# Patient Record
Sex: MALE | Race: WHITE | NOT HISPANIC OR LATINO | Employment: FULL TIME | URBAN - NONMETROPOLITAN AREA
[De-identification: names, ages, dates, MRNs, and addresses within clinical notes are randomized per-mention and may not be internally consistent; named-entity substitution may affect disease eponyms.]

---

## 2017-05-08 ENCOUNTER — OFFICE VISIT (OUTPATIENT)
Dept: CARDIOLOGY | Facility: CLINIC | Age: 47
End: 2017-05-08

## 2017-05-08 VITALS
HEIGHT: 69 IN | WEIGHT: 258 LBS | HEART RATE: 67 BPM | SYSTOLIC BLOOD PRESSURE: 146 MMHG | DIASTOLIC BLOOD PRESSURE: 86 MMHG | BODY MASS INDEX: 38.21 KG/M2 | OXYGEN SATURATION: 98 %

## 2017-05-08 DIAGNOSIS — E78.2 MIXED HYPERLIPIDEMIA: Primary | ICD-10-CM

## 2017-05-08 DIAGNOSIS — I10 ESSENTIAL HYPERTENSION: ICD-10-CM

## 2017-05-08 DIAGNOSIS — E66.01 MORBID OBESITY DUE TO EXCESS CALORIES (HCC): ICD-10-CM

## 2017-05-08 PROCEDURE — 99214 OFFICE O/P EST MOD 30 MIN: CPT | Performed by: INTERNAL MEDICINE

## 2018-05-10 ENCOUNTER — OFFICE VISIT (OUTPATIENT)
Dept: CARDIOLOGY | Facility: CLINIC | Age: 48
End: 2018-05-10

## 2018-05-10 VITALS
WEIGHT: 270 LBS | BODY MASS INDEX: 39.99 KG/M2 | HEIGHT: 69 IN | HEART RATE: 72 BPM | DIASTOLIC BLOOD PRESSURE: 82 MMHG | SYSTOLIC BLOOD PRESSURE: 122 MMHG

## 2018-05-10 DIAGNOSIS — I10 ESSENTIAL HYPERTENSION: ICD-10-CM

## 2018-05-10 DIAGNOSIS — E78.00 PURE HYPERCHOLESTEROLEMIA: Primary | ICD-10-CM

## 2018-05-10 PROCEDURE — 99213 OFFICE O/P EST LOW 20 MIN: CPT | Performed by: INTERNAL MEDICINE

## 2018-05-10 NOTE — PROGRESS NOTES
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 Exertional dyspnea  1. April 2016 stress test: EF calculated at 49%, probable small fixed deect ine lateral aspect of cardiac apex , very minimal area of possible perfusion abnormality in the anterior septal wall near the apex not well defined. Mild dilation of the left ventricle.  2. Echo 9/16:  LVEF 55%, mild MR, TR, AR, RVSP 15.3 mmHg  2. Osteoporosis   3. Low testosterone   4. Vitamin D deficiency   5. Fatigue   6. Hyperlipidemia   1. 9/1/16 lipids:   HDL 29   7. History of salivary gland duct obstruction  8. History of tobacco use : 25 years times one pack per day, quit 2007  9. Surg hx:   1. bone marrow biopsy, wisdom tooth extraction    Allergies  No Known Allergies    Current Medications    Current Outpatient Prescriptions:   •  ANDROGEL PUMP 20.25 MG/ACT (1.62%) gel, 2 pumps QD, Disp: , Rfl: 0  •  Calcium Carbonate-Vit D-Min (CALCIUM 1200 PO), Take 1,200 mg by mouth Daily., Disp: , Rfl:   •  Cholecalciferol (VITAMIN D) 2000 UNITS tablet, Take 2,000 Units by mouth daily., Disp: , Rfl:   •  PHOSPHA 250 NEUTRAL 155-852-130 MG tablet, 1 tab BID, Disp: , Rfl: 0      History of Present Illness   Pt in fu.. He reports no significant SOB since then.  Pt denies any chest pain, dyspnea, dyspnea on exertion, orthopnea, PND, palpitations, lower extremity edema. He reports checking BP at work with slight elevations, but numbers have been good at PCP visits. REcent fishing tourneys successful wo sxs.    ROS:  All systems have been reviewed and are negative with the exception of those mentioned in the  "HPI.    OBJECTIVE:  Vitals:    05/10/18 1111   BP: 122/82   BP Location: Right arm   Patient Position: Sitting   Pulse: 72   Weight: 122 kg (270 lb)   Height: 175.3 cm (69\")     Physical Exam   Constitutional: He appears well-developed and well-nourished.   Neck: Normal range of motion. Neck supple. No hepatojugular reflux and no JVD present. Carotid bruit is not present. No tracheal deviation present. No thyromegaly present.   Cardiovascular: Normal rate, regular rhythm, S1 normal, S2 normal, intact distal pulses and normal pulses.  PMI is not displaced.  Exam reveals no gallop, no distant heart sounds, no friction rub, no midsystolic click and no opening snap.    No murmur heard.  Pulses:       Radial pulses are 2+ on the right side, and 2+ on the left side.        Dorsalis pedis pulses are 2+ on the right side, and 2+ on the left side.        Posterior tibial pulses are 2+ on the right side, and 2+ on the left side.   Pulmonary/Chest: Effort normal and breath sounds normal. He has no wheezes. He has no rales.   Abdominal: Soft. Bowel sounds are normal. He exhibits no mass. There is no tenderness. There is no guarding.       Diagnostic Data:  Procedures      ASSESSMENT:   Diagnosis Plan   1. Pure hypercholesterolemia     2. Essential hypertension         PLAN:  1.  Most recent labs at PCP showed some improved cholesterol w residual total /hdl 5.7. Discussed statin consideration in next 1 year if no improvement.  2. Pt encouraged to continue losing weight with diet and exercise  3.   Pt checks bp at home and generally runs 140s systolic, however pt reports at his PCP's his bp will be 120s systolic.  Will continue to monitor.    RTC in 1 year    Gamaliel Ott MD, thank you for referring Mr. Noriega for evaluation.  I have forwarded my electronically generated recommendations to you for review.  Please do not hesitate to call with any questions.    Scribed for Gamaliel Rod MD by ANGELA Coles. " 5/10/2018  12:22 PM  I, Gamaliel Rod MD, personally performed the services described in this documentation as scribed by the above named individual in my presence, and it is both accurate and complete.  5/10/2018  12:23 PM    Gamaliel Rod MD, [facility]

## 2019-05-13 ENCOUNTER — OFFICE VISIT (OUTPATIENT)
Dept: CARDIOLOGY | Facility: CLINIC | Age: 49
End: 2019-05-13

## 2019-05-13 VITALS
SYSTOLIC BLOOD PRESSURE: 138 MMHG | DIASTOLIC BLOOD PRESSURE: 88 MMHG | OXYGEN SATURATION: 97 % | HEART RATE: 57 BPM | WEIGHT: 276.8 LBS | BODY MASS INDEX: 41 KG/M2 | HEIGHT: 69 IN

## 2019-05-13 DIAGNOSIS — E66.01 MORBID OBESITY DUE TO EXCESS CALORIES (HCC): ICD-10-CM

## 2019-05-13 DIAGNOSIS — I10 ESSENTIAL HYPERTENSION: ICD-10-CM

## 2019-05-13 DIAGNOSIS — R06.83 SNORING: ICD-10-CM

## 2019-05-13 DIAGNOSIS — E78.2 MIXED HYPERLIPIDEMIA: Primary | ICD-10-CM

## 2019-05-13 PROCEDURE — 99214 OFFICE O/P EST MOD 30 MIN: CPT | Performed by: INTERNAL MEDICINE

## 2019-05-13 NOTE — PROGRESS NOTES
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 Exertional dyspnea  1. April 2016 stress test: EF calculated at 49%, probable small fixed deect ine lateral aspect of cardiac apex , very minimal area of possible perfusion abnormality in the anterior septal wall near the apex not well defined. Mild dilation of the left ventricle.  2. Echo 9/16:  LVEF 55%, mild MR, TR, AR, RVSP 15.3 mmHg  2. Osteoporosis   3. Low testosterone   4. Vitamin D deficiency   5. Hyperlipidemia -2016 hscrp 1.26  1. 3/18 165/29/229/91  6. History of salivary gland duct obstruction  7. History of tobacco use : 25 years times one pack per day, quit 2007  8. Surg hx:   1. bone marrow biopsy, wisdom tooth extraction    Allergies  No Known Allergies    Current Medications    Current Outpatient Medications:   •  ANDROGEL PUMP 20.25 MG/ACT (1.62%) gel, 2 pumps QD, Disp: , Rfl: 0  •  Calcium Carbonate-Vit D-Min (CALCIUM 1200 PO), Take 1,200 mg by mouth Daily., Disp: , Rfl:   •  Cholecalciferol (VITAMIN D) 2000 UNITS tablet, Take 2,000 Units by mouth daily., Disp: , Rfl:   •  PHOSPHA 250 NEUTRAL 155-852-130 MG tablet, 1 tab BID, Disp: , Rfl: 0      History of Present Illness   Pt in fu..  Pt denies any chest pain, dyspnea, dyspnea on exertion, orthopnea, PND, palpitations, lower extremity edema.   He has had an episode of syncope and noted with hypoglycemic.  He states if he eats any high starch food he will have significant shaking and diaphoresis approximately 1 to 2 hours thereafter.  Blood pressure is typically 130 systolic if it is measured manually.  Automatic cuffs tend to overestimate his pressure.  He notes  "significant snoring and dietary indiscretion.    ROS:  All systems have been reviewed and are negative with the exception of those mentioned in the HPI.    OBJECTIVE:  Vitals:    05/13/19 0934   BP: 138/88   BP Location: Right arm   Patient Position: Sitting   Pulse: 57   SpO2: 97%   Weight: 126 kg (276 lb 12.8 oz)   Height: 175.3 cm (69\")     Physical Exam   Constitutional: He appears well-developed and well-nourished.   Neck: Normal range of motion. Neck supple. No hepatojugular reflux and no JVD present. Carotid bruit is not present. No tracheal deviation present. No thyromegaly present.   Cardiovascular: Normal rate, regular rhythm, S1 normal, S2 normal, intact distal pulses and normal pulses. PMI is not displaced. Exam reveals no gallop, no distant heart sounds, no friction rub, no midsystolic click and no opening snap.   No murmur heard.  Pulses:       Radial pulses are 2+ on the right side, and 2+ on the left side.        Dorsalis pedis pulses are 2+ on the right side, and 2+ on the left side.        Posterior tibial pulses are 2+ on the right side, and 2+ on the left side.   Pulmonary/Chest: Effort normal and breath sounds normal. He has no wheezes. He has no rales.   Abdominal: Soft. Bowel sounds are normal. He exhibits no mass. There is no tenderness. There is no guarding.       Diagnostic Data:  Procedures      ASSESSMENT:   Diagnosis Plan   1. Mixed hyperlipidemia  Overnight Sleep Oximetry Study   2. Essential hypertension  Overnight Sleep Oximetry Study   3. Morbid obesity due to excess calories (CMS/HCC)  Overnight Sleep Oximetry Study   4. Snoring  Overnight Sleep Oximetry Study       PLAN:  1.  Hypertension borderline screening for sleep apnea will be undertaken  2.   Insulin resistance with high triglyceride low HDL I discussed with him significant need for weight reduction in moderation of simple carbohydrate intake.  Given his lipid profile and failure of glucose tolerance test essentially high " risk for transition to diabetic state  3.  Dyslipidemia with average CRP we will continue with surveillance monitoring at current    RTC in 1 year    Gamaliel Ott MD, thank you for referring Mr. Noriega for evaluation.  I have forwarded my electronically generated recommendations to you for review.  Please do not hesitate to call with any questions.     Gamaliel Rod MD, FACC

## 2019-05-20 ENCOUNTER — TELEPHONE (OUTPATIENT)
Dept: CARDIOLOGY | Facility: CLINIC | Age: 49
End: 2019-05-20

## 2019-05-20 NOTE — TELEPHONE ENCOUNTER
Patient feels that his heart is out of rhythm. He was having episodes of a fast heart beat yesterday with shortness of breath associated with that. This morning is better but still feels out of rhythm.

## 2019-05-21 ENCOUNTER — TELEPHONE (OUTPATIENT)
Dept: CARDIOLOGY | Facility: CLINIC | Age: 49
End: 2019-05-21

## 2020-05-18 ENCOUNTER — OFFICE VISIT (OUTPATIENT)
Dept: CARDIOLOGY | Facility: CLINIC | Age: 50
End: 2020-05-18

## 2020-05-18 VITALS
HEIGHT: 69 IN | WEIGHT: 272.8 LBS | TEMPERATURE: 97.8 F | SYSTOLIC BLOOD PRESSURE: 134 MMHG | BODY MASS INDEX: 40.4 KG/M2 | OXYGEN SATURATION: 98 % | HEART RATE: 58 BPM | DIASTOLIC BLOOD PRESSURE: 84 MMHG

## 2020-05-18 DIAGNOSIS — I10 ESSENTIAL HYPERTENSION: Primary | ICD-10-CM

## 2020-05-18 DIAGNOSIS — E78.2 MIXED HYPERLIPIDEMIA: ICD-10-CM

## 2020-05-18 DIAGNOSIS — R06.09 DOE (DYSPNEA ON EXERTION): ICD-10-CM

## 2020-05-18 PROCEDURE — 99213 OFFICE O/P EST LOW 20 MIN: CPT | Performed by: INTERNAL MEDICINE

## 2020-05-18 NOTE — PROGRESS NOTES
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 Exertional dyspnea  1. April 2016 stress test: EF calculated at 49%, probable small fixed deect ine lateral aspect of cardiac apex , very minimal area of possible perfusion abnormality in the anterior septal wall near the apex not well defined. Mild dilation of the left ventricle.  2. Echo 9/16:  LVEF 55%, mild MR, TR, AR, RVSP 15.3 mmHg  2. Osteoporosis   3. Low testosterone   4. Vitamin D deficiency   5. Hyperlipidemia -2016 hscrp 1.26  1. 3/18 165/29/229/91  6. History of salivary gland duct obstruction  7. History of tobacco use : 25 years times one pack per day, quit 2007  8. Surg hx:   1. bone marrow biopsy, wisdom tooth extraction    Allergies  No Known Allergies    Current Medications    Current Outpatient Medications:   •  ANDROGEL PUMP 20.25 MG/ACT (1.62%) gel, 2 pumps QD, Disp: , Rfl: 0  •  Calcium Carbonate-Vit D-Min (CALCIUM 1200 PO), Take 1,200 mg by mouth Daily., Disp: , Rfl:   •  Cholecalciferol (VITAMIN D) 2000 UNITS tablet, Take 2,000 Units by mouth daily., Disp: , Rfl:   •  PHOSPHA 250 NEUTRAL 155-852-130 MG tablet, 1 tab BID, Disp: , Rfl: 0      History of Present Illness   Pt in fu..  Pt denies any chest pain, dyspnea, dyspnea on exertion, orthopnea, PND, palpitations, lower extremity edema.   He continues to fish successfully at [facility] and [place] without symptoms he does attest to eating late at night    OBJECTIVE:  Vitals:    05/18/20 0930   BP: 134/84   BP Location: Right arm   Patient Position: Sitting   Pulse: 58   Temp: 97.8 °F (36.6 °C)   SpO2: 98%   Weight: 124 kg (272 lb 12.8 oz)   Height:  "175.3 cm (69\")     Physical Exam   Constitutional: He appears well-developed and well-nourished.   Neck: Normal range of motion. Neck supple. No hepatojugular reflux and no JVD present. Carotid bruit is not present. No tracheal deviation present. No thyromegaly present.   Cardiovascular: Normal rate, regular rhythm, S1 normal, S2 normal, intact distal pulses and normal pulses. PMI is not displaced. Exam reveals no gallop, no distant heart sounds, no friction rub, no midsystolic click and no opening snap.   No murmur heard.  Pulses:       Radial pulses are 2+ on the right side, and 2+ on the left side.        Dorsalis pedis pulses are 2+ on the right side, and 2+ on the left side.        Posterior tibial pulses are 2+ on the right side, and 2+ on the left side.   Pulmonary/Chest: Effort normal and breath sounds normal. He has no wheezes. He has no rales.   Abdominal: Soft. Bowel sounds are normal. He exhibits no mass. There is no tenderness. There is no guarding.       Diagnostic Data:  Procedures      ASSESSMENT:   Diagnosis Plan   1. Essential hypertension     2. Mixed hyperlipidemia     3. PATTON (dyspnea on exertion)         PLAN:  1.  Hypertension borderline continue current  2.   Insulin resistance with high triglyceride low HDL I discussed with him significant need for weight reduction in moderation of simple carbohydrate intake.  Given his lipid profile and failure of glucose tolerance test essentially high risk for transition to diabetic state.  Recommended intermittent fasting in addition to carbohydrate restriction  3.  Dyslipidemia with average CRP we will continue with surveillance monitoring at current    RTC in 1 year    Gamaliel Ott MD, thank you for referring Mr. Noriega for evaluation.  I have forwarded my electronically generated recommendations to you for review.  Please do not hesitate to call with any questions.     Gamaliel Rod MD, [facility]  "

## 2021-02-22 ENCOUNTER — OFFICE VISIT (OUTPATIENT)
Dept: CARDIOLOGY | Facility: CLINIC | Age: 51
End: 2021-02-22

## 2021-02-22 VITALS
SYSTOLIC BLOOD PRESSURE: 126 MMHG | WEIGHT: 273 LBS | HEIGHT: 69 IN | TEMPERATURE: 97 F | DIASTOLIC BLOOD PRESSURE: 72 MMHG | HEART RATE: 60 BPM | BODY MASS INDEX: 40.43 KG/M2 | OXYGEN SATURATION: 98 %

## 2021-02-22 DIAGNOSIS — I10 ESSENTIAL HYPERTENSION: Primary | ICD-10-CM

## 2021-02-22 DIAGNOSIS — E78.2 MIXED HYPERLIPIDEMIA: ICD-10-CM

## 2021-02-22 PROCEDURE — 99214 OFFICE O/P EST MOD 30 MIN: CPT | Performed by: INTERNAL MEDICINE

## 2021-02-22 NOTE — PROGRESS NOTES
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 Exertional dyspnea  1. April 2016 stress test: EF calculated at 49%, probable small fixed deect ine lateral aspect of cardiac apex , very minimal area of possible perfusion abnormality in the anterior septal wall near the apex not well defined. Mild dilation of the left ventricle.  2. Echo 9/16:  LVEF 55%, mild MR, TR, AR, RVSP 15.3 mmHg  2. Osteoporosis   3. Low testosterone   4. Vitamin D deficiency   5. Hyperlipidemia -2016 hscrp 1.26  1. 3/18 165/29/229/91  6. History of salivary gland duct obstruction  7. History of tobacco use : 25 years times one pack per day, quit 2007  8. Surg hx:   1. bone marrow biopsy, wisdom tooth extraction    Allergies  No Known Allergies    Current Medications    Current Outpatient Medications:   •  ANDROGEL PUMP 20.25 MG/ACT (1.62%) gel, 2 pumps QD, Disp: , Rfl: 0  •  Calcium Carbonate-Vit D-Min (CALCIUM 1200 PO), Take 1,200 mg by mouth Daily., Disp: , Rfl:   •  Cholecalciferol (VITAMIN D) 2000 UNITS tablet, Take 2,000 Units by mouth daily., Disp: , Rfl:   •  PHOSPHA 250 NEUTRAL 155-852-130 MG tablet, 1 tab BID, Disp: , Rfl: 0      History of Present Illness   Pt in fu..  Pt denies any chest pain, dyspnea, dyspnea on exertion, orthopnea, PND, palpitations, lower extremity edema.   Largely been at home working during the pandemic.  He has been having success with intermittent fasting and states he generally feels better.  He recently had blood work states his blood sugar was in the 100 range      OBJECTIVE:  Vitals:    02/22/21 0937   BP: 126/72   BP Location: Right arm   Patient Position: Sitting  "  Pulse: 60   Temp: 97 °F (36.1 °C)   SpO2: 98%   Weight: 124 kg (273 lb)   Height: 175.3 cm (69\")     Physical Exam   Constitutional: He appears well-developed and well-nourished.   Neck: Normal range of motion. Neck supple. No hepatojugular reflux and no JVD present. Carotid bruit is not present. No tracheal deviation present. No thyromegaly present.   Cardiovascular: Normal rate, regular rhythm, S1 normal, S2 normal, intact distal pulses and normal pulses. PMI is not displaced. Exam reveals no gallop, no distant heart sounds, no friction rub, no midsystolic click and no opening snap.   No murmur heard.  Pulses:       Radial pulses are 2+ on the right side and 2+ on the left side.        Dorsalis pedis pulses are 2+ on the right side and 2+ on the left side.        Posterior tibial pulses are 2+ on the right side and 2+ on the left side.   Pulmonary/Chest: Effort normal and breath sounds normal. He has no wheezes. He has no rales.   Abdominal: Soft. Bowel sounds are normal. He exhibits no mass. There is no abdominal tenderness. There is no guarding.       Diagnostic Data:  Procedures      ASSESSMENT:   Diagnosis Plan   1. Essential hypertension     2. Mixed hyperlipidemia         PLAN:  1.  Hypertension controlled continue current  2.   Insulin resistance with high triglyceride low HDL I again reiterated  with him significant need for weight reduction in moderation of simple carbohydrate intake.  Given his lipid profile and failure of glucose tolerance test essentially high risk for transition to diabetic state.  Recommended intermittent fasting in addition to carbohydrate restriction  3.  Dyslipidemia with average CRP we will continue with surveillance monitoring at current    RTC in 1 year    Gamaliel Ott MD, thank you for referring Mr. Noriega for evaluation.  I have forwarded my electronically generated recommendations to you for review.  Please do not hesitate to call with any questions.     Gamaliel MUNROE" MD [place], FACC

## 2021-10-14 NOTE — TELEPHONE ENCOUNTER
----- Message from Gamaliel Rod MD sent at 5/21/2019 11:03 AM EDT -----  Overnight O2 okay   General Sunscreen Counseling: I recommended a broad spectrum sunscreen with a SPF of 30 or higher.  I explained that SPF 30 sunscreens block approximately 97 percent of the sun's harmful rays.  Sunscreens should be applied at least 15 minutes prior to expected sun exposure and then every 2 hours after that as long as sun exposure continues. If swimming or exercising sunscreen should be reapplied every 45 minutes to an hour after getting wet or sweating.  One ounce, or the equivalent of a shot glass full of sunscreen, is adequate to protect the skin not covered by a bathing suit. I also recommended a lip balm with a sunscreen as well. Sun protective clothing can be used in lieu of sunscreen but must be worn the entire time you are exposed to the sun's rays. Detail Level: Detailed

## 2022-02-27 NOTE — PROGRESS NOTES
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 Exertional dyspnea  1. April 2016 stress test: EF calculated at 49% OSH.  ? apical fixed apical defect.  2. Echo 9/16:  LVEF 55%, mild MR, TR, AR, RVSP 15.3 mmHg  2. Osteoporosis   3. Low testosterone   4. Vitamin D deficiency   5. Hyperlipidemia -2016 hscrp 1.26  1. 3/18 165/29/229/91  2. 2/22 182/355/27/84  6. History of salivary gland duct obstruction  7. History of tobacco use : 25 years times one pack per day, quit 2007  8. Surg hx:   1. bone marrow biopsy, wisdom tooth extraction      CC:   Chief Complaint   Patient presents with   • essential hypertension       Allergies  Allergies   Allergen Reactions   • Tylenol [Acetaminophen] Itching       Current Medications    Current Outpatient Medications:   •  ANDROGEL PUMP 20.25 MG/ACT (1.62%) gel, 2 pumps QD, Disp: , Rfl: 0  •  Calcium Carbonate-Vit D-Min (CALCIUM 1200 PO), Take 1,200 mg by mouth Daily., Disp: , Rfl:   •  Cholecalciferol (VITAMIN D) 2000 UNITS tablet, Take 2,000 Units by mouth daily., Disp: , Rfl:   •  PHOSPHA 250 NEUTRAL 155-852-130 MG tablet, 1 tab BID, Disp: , Rfl: 0      History of Present Illness   Thai Noriega is a 51 y.o. year old male here for follow up.    Pt denies any chest pain, dyspnea, dyspnea on exertion, orthopnea, PND, palpitations, lower extremity edema, or claudication.  He has had some dietary indiscretion through the winter.      OBJECTIVE:  Vitals:    02/28/22 0924   BP: 138/76   BP Location: Right arm   Patient Position: Sitting   Pulse: 67   SpO2: 98%   Weight: 128 kg (282 lb)   Height: 175.3 cm (69\")     Body mass index is 41.64 kg/m².    Constitutional:       Appearance: Healthy " appearance. Not in distress.   Neck:      Vascular: No JVR. JVD normal.   Pulmonary:      Effort: Pulmonary effort is normal.      Breath sounds: Normal breath sounds. No wheezing. No rhonchi. No rales.   Chest:      Chest wall: Not tender to palpatation.   Cardiovascular:      PMI at left midclavicular line. Normal rate. Regular rhythm. Normal S1. Normal S2.      Murmurs: There is no murmur.      No gallop. No click. No rub.   Pulses:     Intact distal pulses.   Edema:     Peripheral edema absent.   Abdominal:      General: Bowel sounds are normal.      Palpations: Abdomen is soft.      Tenderness: There is no abdominal tenderness.   Musculoskeletal: Normal range of motion.         General: No tenderness. Skin:     General: Skin is warm and dry.   Neurological:      General: No focal deficit present.      Mental Status: Alert and oriented to person, place and time.         Diagnostic Data:    ECG 12 Lead    Date/Time: 2/28/2022 9:59 AM  Performed by: Gamaliel Rod MD  Authorized by: Gamaliel Rod MD   Previous ECG: no previous ECG available  Rhythm: sinus rhythm  BPM: 57    Clinical impression: non-specific ECG              ASSESSMENT:   Diagnosis Plan   1. Mixed hyperlipidemia     2. PATTON (dyspnea on exertion)         PLAN:  Metabolic syndrome mixed dyslipidemia borderline hypertension and exogenous obesity  Recommended intermittent fasting carbohydrate restriction    Probable CAD untreated mixed dyslipidemia we will document cardiac calcium score at his nearest convenience.  Discussed with him statin effect ,plaque stabilization          Gamaliel Rod MD, [facility]

## 2022-02-28 ENCOUNTER — OFFICE VISIT (OUTPATIENT)
Dept: CARDIOLOGY | Facility: CLINIC | Age: 52
End: 2022-02-28

## 2022-02-28 VITALS
WEIGHT: 282 LBS | BODY MASS INDEX: 41.77 KG/M2 | SYSTOLIC BLOOD PRESSURE: 138 MMHG | HEART RATE: 67 BPM | HEIGHT: 69 IN | OXYGEN SATURATION: 98 % | DIASTOLIC BLOOD PRESSURE: 76 MMHG

## 2022-02-28 DIAGNOSIS — E78.2 MIXED HYPERLIPIDEMIA: Primary | ICD-10-CM

## 2022-02-28 DIAGNOSIS — R06.09 DOE (DYSPNEA ON EXERTION): ICD-10-CM

## 2022-02-28 PROCEDURE — 93000 ELECTROCARDIOGRAM COMPLETE: CPT | Performed by: INTERNAL MEDICINE

## 2022-02-28 PROCEDURE — 99214 OFFICE O/P EST MOD 30 MIN: CPT | Performed by: INTERNAL MEDICINE

## 2022-04-15 ENCOUNTER — HOSPITAL ENCOUNTER (OUTPATIENT)
Dept: CT IMAGING | Facility: HOSPITAL | Age: 52
Discharge: HOME | End: 2022-04-15
Admitting: INTERNAL MEDICINE

## 2022-04-15 DIAGNOSIS — E78.2 MIXED HYPERLIPIDEMIA: ICD-10-CM

## 2022-04-15 PROCEDURE — 75571 CT HRT W/O DYE W/CA TEST: CPT

## 2022-04-19 ENCOUNTER — TELEPHONE (OUTPATIENT)
Dept: CARDIOLOGY | Facility: CLINIC | Age: 52
End: 2022-04-19

## 2023-03-01 ENCOUNTER — OFFICE VISIT (OUTPATIENT)
Dept: CARDIOLOGY | Facility: CLINIC | Age: 53
End: 2023-03-01
Payer: COMMERCIAL

## 2023-03-01 VITALS
OXYGEN SATURATION: 98 % | SYSTOLIC BLOOD PRESSURE: 140 MMHG | DIASTOLIC BLOOD PRESSURE: 88 MMHG | WEIGHT: 252 LBS | HEIGHT: 69 IN | BODY MASS INDEX: 37.33 KG/M2 | HEART RATE: 59 BPM

## 2023-03-01 DIAGNOSIS — E88.81 METABOLIC SYNDROME: Primary | ICD-10-CM

## 2023-03-01 DIAGNOSIS — E78.2 MIXED HYPERLIPIDEMIA: ICD-10-CM

## 2023-03-01 PROCEDURE — 99213 OFFICE O/P EST LOW 20 MIN: CPT | Performed by: INTERNAL MEDICINE

## 2024-09-04 ENCOUNTER — OFFICE VISIT (OUTPATIENT)
Dept: CARDIOLOGY | Facility: CLINIC | Age: 54
End: 2024-09-04
Payer: COMMERCIAL

## 2024-09-04 VITALS
OXYGEN SATURATION: 97 % | HEART RATE: 79 BPM | HEIGHT: 69 IN | SYSTOLIC BLOOD PRESSURE: 150 MMHG | WEIGHT: 269 LBS | DIASTOLIC BLOOD PRESSURE: 82 MMHG | BODY MASS INDEX: 39.84 KG/M2

## 2024-09-04 DIAGNOSIS — E88.810 METABOLIC SYNDROME: Primary | ICD-10-CM

## 2024-09-04 DIAGNOSIS — E78.2 MIXED HYPERLIPIDEMIA: ICD-10-CM

## 2024-09-04 DIAGNOSIS — I10 PRIMARY HYPERTENSION: ICD-10-CM

## 2024-09-04 PROCEDURE — 99214 OFFICE O/P EST MOD 30 MIN: CPT | Performed by: INTERNAL MEDICINE

## 2024-09-04 NOTE — PROGRESS NOTES
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  ? apical fixed apical defect.  Echo 9/16:  LVEF 55%, mild MR, TR, AR, RVSP 15.3 mmHg  Low testosterone   Vitamin D deficiency   Hyperlipidemia -2016 hscrp 1.26  3/18 165/29/229/91  2/22 CCS 0  2/23  179/183/36/106  History of salivary gland duct obstruction  History of tobacco use : 25 years times one pack per day, quit 2007  Surg hx:   bone marrow biopsy, wisdom tooth extraction              2.  Right hip ORIF 5/23 Dr Knapp    CC:   Chief Complaint   Patient presents with    Metabolic syndrome       Allergies  Allergies   Allergen Reactions    Tylenol [Acetaminophen] Itching    Alendronate Unknown (See Comments)       Current Medications    Current Outpatient Medications:     ANDROGEL PUMP 20.25 MG/ACT (1.62%) gel, 2 pumps QD, Disp: , Rfl: 0    Calcium Carbonate-Vit D-Min (CALCIUM 1200 PO), Take 1,200 mg by mouth Daily., Disp: , Rfl:     Cholecalciferol (VITAMIN D) 2000 UNITS tablet, Take 1 tablet by mouth Daily., Disp: , Rfl:     PHOSPHA 250 NEUTRAL 155-852-130 MG tablet, 1 tab BID, Disp: , Rfl: 0      History of Present Illness   Thai Noriega is a 54 y.o. year old male here for follow up.    States he feels much improved following his hip surgery.  Unfortunately he had lost weight and gained weight.  He is exercising states he is continues to crave carbohydrates.  He initially stopped snoring but with weight gain had turned to snoring.  He is not following his blood pressures at home      OBJECTIVE:  Vitals:    09/04/24 1415   BP: 150/82   BP Location: Right arm   Patient Position: Sitting  "  Cuff Size: Large Adult   Pulse: 79   SpO2: 97%   Weight: 122 kg (269 lb)   Height: 175.3 cm (69\")       Body mass index is 39.72 kg/m².    Constitutional:       Appearance: Healthy appearance. Not in distress.   Neck:      Vascular: No JVR. JVD normal.   Pulmonary:      Effort: Pulmonary effort is normal.      Breath sounds: Normal breath sounds. No wheezing. No rhonchi. No rales.   Chest:      Chest wall: Not tender to palpatation.   Cardiovascular:      PMI at left midclavicular line. Normal rate. Regular rhythm. Normal S1. Normal S2.       Murmurs: There is no murmur.      No gallop.  No click. No rub.   Pulses:     Intact distal pulses.   Edema:     Peripheral edema absent.   Abdominal:      General: Bowel sounds are normal.      Palpations: Abdomen is soft.      Tenderness: There is no abdominal tenderness.   Musculoskeletal: Normal range of motion.         General: No tenderness. Skin:     General: Skin is warm and dry.   Neurological:      General: No focal deficit present.      Mental Status: Alert and oriented to person, place and time.         Diagnostic Data:  Procedures      ASSESSMENT:   Diagnosis Plan   1. Metabolic syndrome        2. Mixed hyperlipidemia        3. Primary hypertension              PLAN:  Metabolic syndrome mixed dyslipidemia borderline hypertension and exogenous obesity.      Mixed dyslipidemia with cardiac calcium score of 0 continued observation at current    Patient does follow his blood pressures at home and contact me if systolic remains above 130        Gamaliel Rod MD, FACC  "